# Patient Record
Sex: FEMALE | Race: WHITE | NOT HISPANIC OR LATINO | Employment: UNEMPLOYED | URBAN - METROPOLITAN AREA
[De-identification: names, ages, dates, MRNs, and addresses within clinical notes are randomized per-mention and may not be internally consistent; named-entity substitution may affect disease eponyms.]

---

## 2024-07-09 ENCOUNTER — OFFICE VISIT (OUTPATIENT)
Dept: URGENT CARE | Facility: CLINIC | Age: 29
End: 2024-07-09

## 2024-07-09 VITALS
DIASTOLIC BLOOD PRESSURE: 76 MMHG | HEART RATE: 62 BPM | RESPIRATION RATE: 16 BRPM | OXYGEN SATURATION: 97 % | TEMPERATURE: 98.6 F | SYSTOLIC BLOOD PRESSURE: 118 MMHG

## 2024-07-09 DIAGNOSIS — S80.861A INSECT BITE OF RIGHT LOWER LEG WITH INFECTION, INITIAL ENCOUNTER: Primary | ICD-10-CM

## 2024-07-09 DIAGNOSIS — L08.9 INSECT BITE OF RIGHT LOWER LEG WITH INFECTION, INITIAL ENCOUNTER: Primary | ICD-10-CM
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
## 2024-07-09 PROCEDURE — G0382 LEV 3 HOSP TYPE B ED VISIT: HCPCS

## 2024-07-09 RX ORDER — DOXYCYCLINE 100 MG/1
100 CAPSULE ORAL 2 TIMES DAILY
Qty: 14 CAPSULE | Refills: 0 | Status: SHIPPED | OUTPATIENT
Start: 2024-07-09 | End: 2024-07-16

## 2024-07-09 NOTE — PROGRESS NOTES
[facility]        NAME: Elizabeth Mac is a 29 y.o. female  : 1995    MRN: [id number]  DATE: 2024  TIME: 5:30 PM    Assessment and Plan   Insect bite of right lower leg with infection, initial encounter [S80.861A, L08.9, W57.XXXA]  1. Insect bite of right lower leg with infection, initial encounter  doxycycline monohydrate (MONODOX) 100 mg capsule            Patient Instructions     Take doxycycline as prescribed  Keep area clean and dry  Watch for signs of infection  Follow up with PCP in 3-5 days.  Proceed to ER if symptoms worsen.     Eat yogurt with live and active cultures and/or take a probiotic at least 3 hours before or after antibiotic dose. Monitor stool for diarrhea and/or blood. If this occurs, contact primary care doctor ASAP.      Doxycycline may cause your skin to be more sensitive to sunlight than it is normally. Exposure to sunlight, even for short periods of time, may cause skin rash, itching, redness or other discoloration of the skin, or a severe sunburn. Practice proper precautions including avoiding excessive sunlight exposure, wear skin protection including clothing and sunscreen to avoid reaction.    If tests are performed, our office will contact you with results only if changes need to made to the care plan discussed with you at the visit. [facility]    Chief Complaint     Chief Complaint   Patient presents with    Insect Bite     Patient with bug bite on RLE. Patient noticed the bite yesterday when she popped the top of the bite with white discharge. Area has since swollen more and became more red around the bite. Patient states since yesterday, the area has significantly grown. Patient denies itchiness but states pain.          History of Present Illness       Patient is a 29-year-old female with no significant PMH presenting in the clinic today for insect bite x 1 day.  Patient notes an insect bite noted along the  medial proximal aspect of the right lower leg.  Patient notes bite wound has increased in size and redness since first noticing the wound yesterday.  Admits to use of over-the-counter antibiotic ointment for symptom management.  Denies fever, chills, numbness, tingling, decreased ROM, purulent drainage, chest pain, and SOB.        Review of Systems   Review of Systems   Constitutional:  Negative for chills and fever.   Respiratory:  Negative for shortness of breath.    Cardiovascular:  Negative for chest pain.   Skin:  Positive for wound. Negative for rash.   Neurological:  Negative for numbness.         Current Medications       Current Outpatient Medications:     doxycycline monohydrate (MONODOX) 100 mg capsule, Take 1 capsule (100 mg total) by mouth 2 (two) times a day for 7 days, Disp: 14 capsule, Rfl: 0    Levonorgestrel (MIRENA) 20 MCG/DAY IUD, 1 each by Intrauterine route once, Disp: , Rfl:     Current Allergies     Allergies as of 2024 - Reviewed 2024   Allergen Reaction Noted    Penicillins Hives 2021            The following portions of the patient's history were reviewed and updated as appropriate: allergies, current medications, past family history, past medical history, past social history, past surgical history and problem list.     Past Medical History:   Diagnosis Date     (spontaneous vaginal delivery) 7/10/2021       Past Surgical History:   Procedure Laterality Date    FINGER SURGERY      staph infection    TONSILECTOMY AND ADNOIDECTOMY  1999    WISDOM TOOTH EXTRACTION         Family History   Problem Relation Age of Onset    Thyroid disease Mother     Hypertension Mother     Factor V Leiden deficiency Mother     Breast cancer Mother     Migraines Mother     No Known Problems Father     Asthma Sister     Migraines Sister     Factor V Leiden deficiency Brother     Diabetes Maternal Grandmother     Hypertension Maternal Grandmother     Diabetes Paternal Grandmother      No Known Problems Paternal Grandfather     Asthma Son     Asthma Other     Diabetes Maternal Uncle     Hypertension Maternal Uncle     Migraines Maternal Uncle     Diabetes Maternal Uncle     Hypertension Maternal Uncle     Migraines Maternal Uncle          Medications have been verified.        Objective   /76   Pulse 62   Temp 98.6 °F (37 °C)   Resp 16   SpO2 97%        Physical Exam     Physical Exam  Vitals reviewed.   Constitutional:       General: She is not in acute distress.     Appearance: Normal appearance. She is normal weight. She is not ill-appearing.   HENT:      Head: Normocephalic.      Nose: Nose normal.      Mouth/Throat:      Mouth: Mucous membranes are moist.   Eyes:      Conjunctiva/sclera: Conjunctivae normal.   Cardiovascular:      Rate and Rhythm: Normal rate and regular rhythm.      Pulses: Normal pulses.      Heart sounds: Normal heart sounds. No murmur heard.     No friction rub. No gallop.   Pulmonary:      Effort: Pulmonary effort is normal.      Breath sounds: Normal breath sounds. No wheezing, rhonchi or rales.   Musculoskeletal:      Cervical back: Normal range of motion and neck supple.   Skin:     General: Skin is warm.      Findings: Erythema and wound present. No rash.      Comments: Bite wound noted to the medial proximal aspect of the right lower leg.  Surrounding swelling, erythema, warmth, and induration noted.  Consistent with cellulitis.   Neurological:      Mental Status: She is alert.   Psychiatric:         Mood and Affect: Mood normal.         Behavior: Behavior normal.

## 2024-07-09 NOTE — PATIENT INSTRUCTIONS
Take doxycycline as prescribed  Keep area clean and dry  Watch for signs of infection  Follow up with PCP in 3-5 days.  Proceed to ER if symptoms worsen.     Eat yogurt with live and active cultures and/or take a probiotic at least 3 hours before or after antibiotic dose. Monitor stool for diarrhea and/or blood. If this occurs, contact primary care doctor ASAP.      Doxycycline may cause your skin to be more sensitive to sunlight than it is normally. Exposure to sunlight, even for short periods of time, may cause skin rash, itching, redness or other discoloration of the skin, or a severe sunburn. Practice proper precautions including avoiding excessive sunlight exposure, wear skin protection including clothing and sunscreen to avoid reaction.